# Patient Record
Sex: FEMALE | Race: WHITE | NOT HISPANIC OR LATINO | Employment: UNEMPLOYED | URBAN - METROPOLITAN AREA
[De-identification: names, ages, dates, MRNs, and addresses within clinical notes are randomized per-mention and may not be internally consistent; named-entity substitution may affect disease eponyms.]

---

## 2023-05-13 ENCOUNTER — HOSPITAL ENCOUNTER (EMERGENCY)
Facility: HOSPITAL | Age: 58
Discharge: HOME | End: 2023-05-13
Attending: EMERGENCY MEDICINE
Payer: MEDICAID

## 2023-05-13 VITALS
TEMPERATURE: 98 F | BODY MASS INDEX: 38.19 KG/M2 | WEIGHT: 252 LBS | RESPIRATION RATE: 18 BRPM | DIASTOLIC BLOOD PRESSURE: 85 MMHG | HEIGHT: 68 IN | HEART RATE: 92 BPM | SYSTOLIC BLOOD PRESSURE: 146 MMHG | OXYGEN SATURATION: 97 %

## 2023-05-13 DIAGNOSIS — M25.511 RIGHT SHOULDER PAIN: ICD-10-CM

## 2023-05-13 PROCEDURE — 99284 EMERGENCY DEPT VISIT MOD MDM: CPT

## 2023-05-13 PROCEDURE — 63600175 PHARM REV CODE 636 W HCPCS: Performed by: EMERGENCY MEDICINE

## 2023-05-13 PROCEDURE — 96372 THER/PROPH/DIAG INJ SC/IM: CPT | Performed by: EMERGENCY MEDICINE

## 2023-05-13 RX ORDER — DICLOFENAC SODIUM 10 MG/G
2 GEL TOPICAL 4 TIMES DAILY
Qty: 100 G | Refills: 0 | Status: SHIPPED | OUTPATIENT
Start: 2023-05-13

## 2023-05-13 RX ORDER — KETOROLAC TROMETHAMINE 30 MG/ML
15 INJECTION, SOLUTION INTRAMUSCULAR; INTRAVENOUS
Status: COMPLETED | OUTPATIENT
Start: 2023-05-13 | End: 2023-05-13

## 2023-05-13 RX ADMIN — KETOROLAC TROMETHAMINE 15 MG: 30 INJECTION, SOLUTION INTRAMUSCULAR; INTRAVENOUS at 05:05

## 2023-05-13 NOTE — ED PROVIDER NOTES
Encounter Date: 5/13/2023       History     Chief Complaint   Patient presents with    Shoulder Pain     Pt c/o right shoulder pain x a few weeks. Pain has increased and now has decreased rom. No obvious deformity. No injury or trauma. Pt believes she injured the shoulder when doing PT for her neck a few years ago.      Ivonne Diaz is a 57 y.o. female who  has no past medical history on file.    The patient presents to the ED due to 3 weeks of right shoulder pain.  Patient denies any preceding injury.  She states that she started gradually having pain.  She states it is worse with moving her right shoulder and radiates around her shoulder and to her neck.  She reports no numbness weakness chest pain trouble breathing or other symptoms this time.  She tried taking Tylenol without relief.  No other concerns noted today.    The history is provided by the patient.   Review of patient's allergies indicates:  No Known Allergies  No past medical history on file.  No past surgical history on file.  No family history on file.     Review of Systems   Constitutional:  Negative for fatigue.   Respiratory:  Negative for shortness of breath.    Cardiovascular:  Negative for chest pain.   Gastrointestinal:  Negative for abdominal pain.   Musculoskeletal:  Positive for arthralgias.     Physical Exam     Initial Vitals [05/13/23 1632]   BP Pulse Resp Temp SpO2   (!) 146/85 92 18 98.4 °F (36.9 °C) 97 %      MAP       --         Physical Exam    Constitutional: No distress.   HENT:   Head: Normocephalic and atraumatic.   Eyes: Conjunctivae are normal.   Cardiovascular:  Intact distal pulses.           Pulmonary/Chest: No respiratory distress.   Musculoskeletal:      Comments: Right upper extremity: Sensation intact to light touch.  Radial pulse palpable.  No wrist elbow or upper arm tenderness.  Diffuse tenderness to palpation of right shoulder.  No skin changes.  Range motion intact.  Negative Spurling test.  No midline C-spine  tenderness.     Neurological: She is alert.   Skin: Skin is warm and dry.   Psychiatric: She has a normal mood and affect.       ED Course   Procedures  Labs Reviewed - No data to display       Imaging Results              X-Ray Shoulder Trauma Right (Final result)  Result time 05/13/23 17:25:50      Final result by Modesto Castro MD (05/13/23 17:25:50)                   Impression:      No evidence of a fracture or dislocation of the right shoulder.    Osteoarthrosis of the right shoulder.      Electronically signed by: Modesto Csatro MD  Date:    05/13/2023  Time:    17:25               Narrative:    EXAMINATION:  XR SHOULDER TRAUMA 3 VIEW RIGHT    CLINICAL HISTORY:  Pain in right shoulder    TECHNIQUE:  Three or four views of the right shoulder were performed.    COMPARISON:  None    FINDINGS:  The bone mineralization is within normal limits.  There is no cortical step-off there is no evidence of periostitis.    There is mild narrowing of the glenohumeral joint.  There is minimal arthropathy of the acromioclavicular joint.  The coracoclavicular interval is within normal limits.    The visualized right hemithorax is within normal limits.  There is no evidence of a pneumothorax or pulmonary contusion.    There is no evidence of a fracture or dislocation                                       Medications   ketorolac injection 15 mg (15 mg Intramuscular Given 5/13/23 1734)     Medical Decision Making:   Initial Assessment:   Patient presents with shoulder pain worse with movement.  Her vital signs are stable.  Her exam is consistent with musculoskeletal etiology of her pain.  Will plan to treat symptomatically obtain an x-ray and reassess.  She is neurovascularly intact  Differential Diagnosis:   .Differential Diagnosis includes, but is not limited to:  Fracture, dislocation, compartment syndrome, nerve injury/palsy, vascular injury, rhabdomyolysis, hemarthrosis, septic joint, bursitis, muscle strain, ligament  tear/sprain, laceration with foreign body, abrasion, soft tissue contusion, osteoarthritis.    ED Management:  Upon re-evaluation, the patient's status has improved.  After complete ED evaluation, clinical impression is most consistent with R shoulder pain.  Feeling better on reassessement, XR negative.  Will refer to ortho for further evaluation    At this time, I feel there is no emergent condition requiring further evaluation or admission. I believe the patient is stable for discharge from the ED. The patient and any additional family present were updated with test results, overall clinical impression, and recommended further plan of care. All questions were answered. The patient expressed understanding and agreed with current plan for discharge with PCP.ortho follow-up within 1 week. Strict return precautions were provided, including numbness weakness chest pain trouble breathing return/worsening of current symptoms or any other concerns.       After taking into careful account the historical factors and physical exam findings of the patient's presentation today, in conjunction with the empirical and objective data obtained on ED workup, no acute emergent medical condition has been identified. The patient appears to be low risk for an emergent medical condition and I feel it is safe and appropriate at this time for the patient to be discharged to follow-up as detailed in their discharge instructions for reevaluation and possible continued outpatient workup and management. I have discussed the specifics of the workup with the patient and the patient has verbalized understanding of the details of the workup, the diagnosis, the treatment plan, and the need for outpatient follow-up.  Although the patient has no emergent etiology today this does not preclude the development of an emergent condition so in addition, I have advised the patient that they can return to the ED and/or activate EMS at any time with worsening  of their symptoms, change of their symptoms, or with any other medical complaint.  The patient remained comfortable and stable during their visit in the ED.  Discharge and follow-up instructions discussed with the patient who expressed understanding and willingness to comply with my recommendations.             ED Course as of 05/13/23 2234   Sat May 13, 2023   1737 X-Ray Shoulder Trauma Right  X-Ray Reviewed. Negative for acute fracture or dislocation by my independent interpretation.   [RN]      ED Course User Index  [RN] Chase Deleon Jr., MD                 Clinical Impression:   Final diagnoses:  [M25.511] Right shoulder pain        ED Disposition Condition    Discharge Stable          ED Prescriptions       Medication Sig Dispense Start Date End Date Auth. Provider    diclofenac sodium (VOLTAREN) 1 % Gel Apply 2 g topically 4 (four) times daily. 100 g 5/13/2023 -- Chase Deleon Jr., MD          Follow-up Information       Follow up With Specialties Details Why Contact Info Additional Information    [facility] Schedule an appointment as soon as possible for a visit   [address], [facility] [address]  [facility] [address] [phone] Please park in [address] or [address] and use [address]. Take Medical Office Bldg. elevators.          Portions of this note were dictated using voice recognition software and may contain dictation related errors in spelling/grammar/syntax not found on text review       Chase Deleon Jr., MD  05/13/23 2236

## 2023-05-13 NOTE — DISCHARGE INSTRUCTIONS

## 2023-12-21 ENCOUNTER — OFFICE VISIT (OUTPATIENT)
Dept: URGENT CARE | Facility: CLINIC | Age: 58
End: 2023-12-21
Payer: MEDICAID

## 2023-12-21 VITALS
DIASTOLIC BLOOD PRESSURE: 93 MMHG | RESPIRATION RATE: 16 BRPM | HEIGHT: 68 IN | WEIGHT: 252 LBS | BODY MASS INDEX: 38.19 KG/M2 | HEART RATE: 102 BPM | OXYGEN SATURATION: 95 % | TEMPERATURE: 99 F | SYSTOLIC BLOOD PRESSURE: 145 MMHG

## 2023-12-21 DIAGNOSIS — R06.2 WHEEZING: ICD-10-CM

## 2023-12-21 DIAGNOSIS — R05.9 COUGH, UNSPECIFIED TYPE: ICD-10-CM

## 2023-12-21 DIAGNOSIS — U07.1 COVID-19: Primary | ICD-10-CM

## 2023-12-21 LAB
CTP QC/QA: YES
SARS-COV-2 AG RESP QL IA.RAPID: POSITIVE

## 2023-12-21 PROCEDURE — 99213 OFFICE O/P EST LOW 20 MIN: CPT | Mod: S$GLB,,,

## 2023-12-21 PROCEDURE — 87811 SARS-COV-2 COVID19 W/OPTIC: CPT | Mod: QW,S$GLB,,

## 2023-12-21 PROCEDURE — 87811 SARS CORONAVIRUS 2 ANTIGEN POCT, MANUAL READ: ICD-10-PCS | Mod: QW,S$GLB,,

## 2023-12-21 PROCEDURE — 99213 PR OFFICE/OUTPT VISIT, EST, LEVL III, 20-29 MIN: ICD-10-PCS | Mod: S$GLB,,,

## 2023-12-21 RX ORDER — METHYLPREDNISOLONE 4 MG/1
TABLET ORAL
Qty: 21 EACH | Refills: 0 | Status: SHIPPED | OUTPATIENT
Start: 2023-12-21 | End: 2024-01-11

## 2023-12-21 RX ORDER — BENZONATATE 200 MG/1
200 CAPSULE ORAL 3 TIMES DAILY PRN
Qty: 30 CAPSULE | Refills: 0 | Status: SHIPPED | OUTPATIENT
Start: 2023-12-21 | End: 2023-12-31

## 2023-12-21 RX ORDER — FUROSEMIDE 40 MG/1
40 TABLET ORAL
COMMUNITY
Start: 2023-12-11

## 2023-12-21 RX ORDER — INSULIN ASPART 100 [IU]/ML
INJECTION, SOLUTION INTRAVENOUS; SUBCUTANEOUS
COMMUNITY
Start: 2023-12-18

## 2023-12-21 RX ORDER — GABAPENTIN 400 MG/1
400 CAPSULE ORAL NIGHTLY
COMMUNITY
Start: 2023-12-18

## 2023-12-21 RX ORDER — CARVEDILOL 12.5 MG/1
12.5 TABLET ORAL 2 TIMES DAILY
COMMUNITY
Start: 2023-12-11

## 2023-12-21 RX ORDER — AMLODIPINE BESYLATE 10 MG/1
10 TABLET ORAL
COMMUNITY
Start: 2023-12-11

## 2023-12-21 RX ORDER — FLUTICASONE PROPIONATE 50 MCG
1 SPRAY, SUSPENSION (ML) NASAL DAILY
Qty: 16 G | Refills: 0 | Status: SHIPPED | OUTPATIENT
Start: 2023-12-21 | End: 2023-12-31

## 2023-12-21 RX ORDER — CETIRIZINE HYDROCHLORIDE 10 MG/1
10 TABLET ORAL DAILY
Qty: 30 TABLET | Refills: 11 | Status: SHIPPED | OUTPATIENT
Start: 2023-12-21 | End: 2024-12-20

## 2023-12-21 RX ORDER — ALBUTEROL SULFATE 90 UG/1
2 AEROSOL, METERED RESPIRATORY (INHALATION) EVERY 6 HOURS PRN
Qty: 18 G | Refills: 0 | Status: SHIPPED | OUTPATIENT
Start: 2023-12-21 | End: 2024-12-20

## 2023-12-21 RX ORDER — ATORVASTATIN CALCIUM 40 MG/1
40 TABLET, FILM COATED ORAL
COMMUNITY

## 2023-12-21 RX ORDER — METFORMIN HYDROCHLORIDE 1000 MG/1
1000 TABLET ORAL 2 TIMES DAILY
COMMUNITY
Start: 2023-12-11

## 2023-12-21 NOTE — PROGRESS NOTES
"Subjective:      Patient ID: Ivonne Diaz is a 58 y.o. female.    Vitals:  height is 5' 8" (1.727 m) and weight is 114.3 kg (252 lb). Her oral temperature is 99.2 °F (37.3 °C). Her blood pressure is 145/93 (abnormal) and her pulse is 102. Her respiration is 16 and oxygen saturation is 95%.     Chief Complaint: Cough    This is a 58 y.o. female who presents today with a chief complaint of productive/dry cough, fever, chest congestion, wheezing, body aches, vomiting, nausea, diarrhea, chills and chest tightness. Patient symptoms started 3 days ago, she states she was exposed to covid from her neighbor.  Denies any recent travel.  Denies history of seasonal allergies. Patient has taken tylenol, robitussin with no relief. Max temp was 101. Denies numbness or tingling. Denies radiation of pain. Denies  chest pain, shortness of breath, abdominal pain, URI symptoms, urinary symptoms or rashes.       Cough  This is a new problem. The current episode started in the past 7 days. The problem has been unchanged. The cough is Productive of sputum. Associated symptoms include chills, a fever, headaches, myalgias, nasal congestion, postnasal drip, a sore throat, shortness of breath and wheezing. Pertinent negatives include no chest pain, ear congestion, ear pain, eye redness or rash. There is no history of environmental allergies.       Constitution: Positive for chills, sweating, fatigue, fever and generalized weakness. Negative for appetite change.   HENT:  Positive for postnasal drip and sore throat. Negative for ear pain, ear discharge, tinnitus, congestion, sinus pain, sinus pressure, trouble swallowing and voice change.    Neck: Negative for neck pain, neck stiffness and neck swelling.   Cardiovascular:  Negative for chest pain, leg swelling, palpitations and sob on exertion.   Eyes:  Negative for eye discharge, eye itching, eye pain and eye redness.   Respiratory:  Positive for chest tightness, cough, shortness of breath " and wheezing. Negative for sputum production, COPD and asthma.    Gastrointestinal:  Positive for nausea, vomiting and diarrhea. Negative for abdominal pain and constipation.   Genitourinary:  Negative for dysuria, frequency and urgency.   Musculoskeletal:  Positive for muscle ache. Negative for pain.   Skin:  Negative for rash.   Allergic/Immunologic: Negative for environmental allergies, seasonal allergies, asthma and sneezing.   Neurological:  Positive for headaches. Negative for dizziness and light-headedness.   Psychiatric/Behavioral:  Negative for confusion.       Objective:     Physical Exam   Constitutional: She is oriented to person, place, and time. She appears well-developed. She is cooperative.  Non-toxic appearance. She does not appear ill. No distress.   HENT:   Head: Normocephalic and atraumatic.   Ears:   Right Ear: Hearing, external ear and ear canal normal. No no drainage, swelling or tenderness. Tympanic membrane is not injected, not scarred, not perforated, not erythematous, not retracted and not bulging. A middle ear effusion is present.   Left Ear: Hearing, external ear and ear canal normal. No no drainage, swelling or tenderness. Tympanic membrane is not injected, not scarred, not perforated, not erythematous, not retracted and not bulging. A middle ear effusion is present.   Nose: Mucosal edema and rhinorrhea present. No nasal deformity. No epistaxis. Right sinus exhibits frontal sinus tenderness. Right sinus exhibits no maxillary sinus tenderness. Left sinus exhibits frontal sinus tenderness. Left sinus exhibits no maxillary sinus tenderness.   Mouth/Throat: Uvula is midline and mucous membranes are normal. No trismus in the jaw. Normal dentition. No uvula swelling. Posterior oropharyngeal edema, posterior oropharyngeal erythema and cobblestoning present. No oropharyngeal exudate. Tonsils are 1+ on the right. Tonsils are 2+ on the left. No tonsillar exudate.   Eyes: Conjunctivae and lids are  normal. No scleral icterus. Extraocular movement intact   Neck: Trachea normal and phonation normal. Neck supple. No edema present. No erythema present. No neck rigidity present.   Cardiovascular: Normal rate, regular rhythm, normal heart sounds and normal pulses.   Pulmonary/Chest: Effort normal. No stridor. No respiratory distress. She has no decreased breath sounds. She has wheezes in the right upper field, the right middle field, the right lower field, the left upper field, the left middle field and the left lower field. She has no rhonchi. She has no rales.   Abdominal: Normal appearance.   Musculoskeletal: Normal range of motion.         General: No deformity. Normal range of motion.   Lymphadenopathy:     She has cervical adenopathy.   Neurological: She is alert and oriented to person, place, and time. She exhibits normal muscle tone. Coordination normal.   Skin: Skin is warm, dry, intact, not diaphoretic and not pale.   Psychiatric: Her speech is normal and behavior is normal. Judgment and thought content normal.   Nursing note and vitals reviewed.      Assessment:     1. COVID-19    2. Cough, unspecified type    3. Wheezing        Plan:     Results for orders placed or performed in visit on 12/21/23   SARS Coronavirus 2 Antigen, POCT Manual Read   Result Value Ref Range    SARS Coronavirus 2 Antigen Positive (A) Negative     Acceptable Yes          COVID-19  -     benzonatate (TESSALON) 200 MG capsule; Take 1 capsule (200 mg total) by mouth 3 (three) times daily as needed for Cough.  Dispense: 30 capsule; Refill: 0  -     fluticasone propionate (FLONASE) 50 mcg/actuation nasal spray; 1 spray (50 mcg total) by Each Nostril route once daily. for 10 days  Dispense: 16 g; Refill: 0  -     cetirizine (ZYRTEC) 10 MG tablet; Take 1 tablet (10 mg total) by mouth once daily.  Dispense: 30 tablet; Refill: 11  -     methylPREDNISolone (MEDROL DOSEPACK) 4 mg tablet; use as directed  Dispense: 21 each;  Refill: 0  -     albuterol (VENTOLIN HFA) 90 mcg/actuation inhaler; Inhale 2 puffs into the lungs every 6 (six) hours as needed for Wheezing. Rescue  Dispense: 18 g; Refill: 0  -     molnupiravir 200 mg capsule (EUA); Take 4 capsules (800 mg total) by mouth every 12 (twelve) hours. for 5 days  Dispense: 40 capsule; Refill: 0    Cough, unspecified type  -     SARS Coronavirus 2 Antigen, POCT Manual Read  -     benzonatate (TESSALON) 200 MG capsule; Take 1 capsule (200 mg total) by mouth 3 (three) times daily as needed for Cough.  Dispense: 30 capsule; Refill: 0    Wheezing  -     methylPREDNISolone (MEDROL DOSEPACK) 4 mg tablet; use as directed  Dispense: 21 each; Refill: 0  -     albuterol (VENTOLIN HFA) 90 mcg/actuation inhaler; Inhale 2 puffs into the lungs every 6 (six) hours as needed for Wheezing. Rescue  Dispense: 18 g; Refill: 0           1: covid risk score. We will treat for viral etiology in clinic today.  Patient is on day 4/5 of quarantine, discussed specific precautions and protocols regarding COVID-19 based off a CDC guidelines.  Patient can take OTC Acetaminophen (Tylenol) and/or Ibuprofen (Motrin) as needed for pain relief and/or fever relief. Continue to drink plenty of fluids. Follow up with PCP.         Patient Instructions   Please drink plenty of fluids.  Please get plenty of rest.  Please return here or go to the Emergency Department for any concerns or worsening of condition.  If you were prescribed antiviral and Medrol Dosepak, please take them to completion.  Use Flonase nasal spray as directed and take daily Zyrtec for the next 10 days.  Recommended for patient to use albuterol inhaler at least twice today, starting tomorrow she can use it as needed.  Take Tessalon Perles up to 3 times a day as needed for cough.  Recommended for patient to drink hot tea with honey. Consider eating softer foods such as soup and broth for the next couple of days to prevent further throat irritation.  Recommended for patient to refrain from acidic foods (such as tomatoes or caffeine) to prevent throat irritation for the next couple of days.   If you do not have Hypertension or any history of palpitations, it is ok to take over the counter Sudafed or Mucinex D or Allegra-D or Claritin-D or Zyrtec-D.  If you do take one of the above, it is ok to combine that with plain over the counter Mucinex or Allegra or Claritin or Zyrtec.  If for example you are taking Zyrtec -D, you can combine that with Mucinex, but not Mucinex-D.  If you are taking Mucinex-D, you can combine that with plain Allegra or Claritin or Zyrtec.   If you do have Hypertension or palpitations, it is safe to take Coricidin HBP for relief of sinus symptoms.  If not allergic, please take over the counter Tylenol (Acetaminophen) and/or Motrin (Ibuprofen) as directed for control of pain and/or fever.  Please follow up with your primary care doctor or specialist as needed.    If you  smoke, please stop smoking.          Additional MDM:     Heart Failure Score:   COPD = No